# Patient Record
Sex: MALE | Race: WHITE | URBAN - METROPOLITAN AREA
[De-identification: names, ages, dates, MRNs, and addresses within clinical notes are randomized per-mention and may not be internally consistent; named-entity substitution may affect disease eponyms.]

---

## 2019-07-02 PROCEDURE — 87086 URINE CULTURE/COLONY COUNT: CPT | Performed by: EMERGENCY MEDICINE

## 2024-04-01 ENCOUNTER — OFFICE VISIT (OUTPATIENT)
Dept: ORTHOPEDIC SURGERY | Age: 29
End: 2024-04-01
Payer: COMMERCIAL

## 2024-04-01 ENCOUNTER — TELEPHONE (OUTPATIENT)
Dept: ORTHOPEDIC SURGERY | Age: 29
End: 2024-04-01

## 2024-04-01 VITALS — WEIGHT: 148 LBS | HEIGHT: 73 IN | BODY MASS INDEX: 19.61 KG/M2

## 2024-04-01 DIAGNOSIS — M25.552 PAIN OF LEFT HIP: ICD-10-CM

## 2024-04-01 DIAGNOSIS — S73.192A TEAR OF LEFT ACETABULAR LABRUM, INITIAL ENCOUNTER: Primary | ICD-10-CM

## 2024-04-01 DIAGNOSIS — M25.852 FEMOROACETABULAR IMPINGEMENT OF LEFT HIP: ICD-10-CM

## 2024-04-01 PROCEDURE — 99204 OFFICE O/P NEW MOD 45 MIN: CPT | Performed by: ORTHOPAEDIC SURGERY

## 2024-04-01 RX ORDER — NAPROXEN 375 MG/1
375 TABLET ORAL 2 TIMES DAILY WITH MEALS
Qty: 60 TABLET | Refills: 3 | Status: SHIPPED | OUTPATIENT
Start: 2024-04-01

## 2024-04-01 RX ORDER — KETOROLAC TROMETHAMINE 10 MG/1
10 TABLET, FILM COATED ORAL EVERY 6 HOURS PRN
Qty: 24 TABLET | Refills: 0 | Status: SHIPPED | OUTPATIENT
Start: 2024-04-01 | End: 2024-04-07

## 2024-04-01 NOTE — TELEPHONE ENCOUNTER
Other PATIENT CALLED IN REQ TO SPEAK WITH THE NURSE OR DOCTOR IN REGARDS TO A FEW QUESTIONS    PATIENT  CAN BE REACHED [phone]

## 2024-04-01 NOTE — PROGRESS NOTES
Date:  2024    Name:  Conner Valladares  Address:  [facility] [address]  [place] [address]    :  1995      Age:   28 y.o.    SSN:  [id number]      Medical Record Number:  [id number]    Reason for Visit:    Chief Complaint    Hip Pain (Left hip)      DOS:2024     HPI: Conner Valladares is a 28 y.o. male here today for evaluation of left hip pain.  He is a long-distance runner and has been experiencing left hip pain since about 2024.  There is no specific injury but he did notice it after running after it started with deep and lateral glute pain.  Now specifically, he has lateral and anterior hip pain which is made worse with running and prolonged sitting.  He has tried formal therapy as well as over-the-counter anti-inflammatories as well as taking some time off running although his hip continues to bother him.  He did see a prior  sportsdoc provider and he underwent a left hip steroid injection either in late February or early March and he says this only provided some very minimal temporary relief of his pain.  He was supposed to run the [place] marathon in a couple of weeks but is unsure if he is able to do it at this point time.    The patient denies any bowel/bladder symptoms, or any numbness, tingling, or weakness down the affected thigh or leg. The patient denies any prior hip injuries, surgeries, or any childhood history of hip disorders.    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       Pain Assessment  Location of Pain: Hip  Location Modifiers: Left  Severity of Pain: 6  Quality of Pain: Aching, Other (Comment) (stiff)  Frequency of Pain: Constant  Aggravating Factors: Bending, Exercise, Other (Comment) (turning)  Limiting Behavior: Yes  Relieving Factors: Rest  Result of Injury: No  Work-Related Injury: No  ROS: Review of systems reviewed from Patient History Form completed today and available in the patient's chart under the Media tab.       No